# Patient Record
Sex: FEMALE | Race: WHITE | Employment: OTHER | URBAN - NONMETROPOLITAN AREA
[De-identification: names, ages, dates, MRNs, and addresses within clinical notes are randomized per-mention and may not be internally consistent; named-entity substitution may affect disease eponyms.]

---

## 2020-10-12 ENCOUNTER — PROCEDURE VISIT (OUTPATIENT)
Dept: OTOLARYNGOLOGY | Age: 35
End: 2020-10-12
Payer: MEDICARE

## 2020-10-12 ENCOUNTER — OFFICE VISIT (OUTPATIENT)
Dept: OTOLARYNGOLOGY | Age: 35
End: 2020-10-12
Payer: MEDICARE

## 2020-10-12 VITALS
HEIGHT: 67 IN | BODY MASS INDEX: 39.87 KG/M2 | DIASTOLIC BLOOD PRESSURE: 66 MMHG | SYSTOLIC BLOOD PRESSURE: 108 MMHG | WEIGHT: 254 LBS

## 2020-10-12 PROBLEM — H69.83 EUSTACHIAN TUBE DYSFUNCTION, BILATERAL: Status: ACTIVE | Noted: 2020-10-12

## 2020-10-12 PROCEDURE — G8427 DOCREV CUR MEDS BY ELIG CLIN: HCPCS | Performed by: PHYSICIAN ASSISTANT

## 2020-10-12 PROCEDURE — 99203 OFFICE O/P NEW LOW 30 MIN: CPT | Performed by: PHYSICIAN ASSISTANT

## 2020-10-12 PROCEDURE — 92557 COMPREHENSIVE HEARING TEST: CPT | Performed by: AUDIOLOGIST

## 2020-10-12 PROCEDURE — G8417 CALC BMI ABV UP PARAM F/U: HCPCS | Performed by: PHYSICIAN ASSISTANT

## 2020-10-12 PROCEDURE — G8484 FLU IMMUNIZE NO ADMIN: HCPCS | Performed by: PHYSICIAN ASSISTANT

## 2020-10-12 PROCEDURE — 92550 TYMPANOMETRY & REFLEX THRESH: CPT | Performed by: AUDIOLOGIST

## 2020-10-12 PROCEDURE — 1036F TOBACCO NON-USER: CPT | Performed by: PHYSICIAN ASSISTANT

## 2020-10-12 RX ORDER — PANTOPRAZOLE SODIUM 40 MG/1
TABLET, DELAYED RELEASE ORAL
COMMUNITY
Start: 2020-09-22

## 2020-10-12 RX ORDER — PSEUDOEPHEDRINE HYDROCHLORIDE 30 MG/1
30 TABLET ORAL 3 TIMES DAILY
Qty: 30 TABLET | Refills: 2 | Status: SHIPPED | OUTPATIENT
Start: 2020-10-12 | End: 2021-01-25 | Stop reason: SINTOL

## 2020-10-12 RX ORDER — IBUPROFEN 800 MG/1
TABLET ORAL
COMMUNITY
End: 2022-05-16

## 2020-10-12 RX ORDER — FLUTICASONE PROPIONATE 50 MCG
2 SPRAY, SUSPENSION (ML) NASAL 2 TIMES DAILY
Qty: 1 BOTTLE | Refills: 2 | Status: SHIPPED | OUTPATIENT
Start: 2020-10-12 | End: 2021-01-04

## 2020-10-12 RX ORDER — ACYCLOVIR 400 MG/1
800 TABLET ORAL
COMMUNITY

## 2020-10-12 RX ORDER — TRAZODONE HYDROCHLORIDE 150 MG/1
TABLET ORAL
COMMUNITY

## 2020-10-12 RX ORDER — ARMODAFINIL 250 MG/1
TABLET ORAL
COMMUNITY
Start: 2020-09-22

## 2020-10-12 RX ORDER — CEPHALEXIN 250 MG/1
CAPSULE ORAL
COMMUNITY
Start: 2020-09-14 | End: 2021-02-25 | Stop reason: ALTCHOICE

## 2020-10-12 RX ORDER — VENLAFAXINE HYDROCHLORIDE 75 MG/1
CAPSULE, EXTENDED RELEASE ORAL
COMMUNITY
End: 2021-01-04 | Stop reason: SDUPTHER

## 2020-10-12 RX ORDER — ECHINACEA PURPUREA EXTRACT 125 MG
2 TABLET ORAL NIGHTLY
Qty: 1 BOTTLE | Refills: 2 | Status: SHIPPED | OUTPATIENT
Start: 2020-10-12 | End: 2021-01-04

## 2020-10-12 RX ORDER — PROMETHAZINE HYDROCHLORIDE 25 MG/1
TABLET ORAL
COMMUNITY
Start: 2020-08-13 | End: 2022-05-16

## 2020-10-12 RX ORDER — OFLOXACIN 3 MG/ML
SOLUTION/ DROPS OPHTHALMIC
COMMUNITY
Start: 2020-09-12 | End: 2021-01-04

## 2020-10-12 RX ORDER — CEPHALEXIN 500 MG/1
CAPSULE ORAL
COMMUNITY
End: 2021-01-04

## 2020-10-12 RX ORDER — DICYCLOMINE HCL 20 MG
TABLET ORAL
COMMUNITY
Start: 2020-09-22 | End: 2021-01-04

## 2020-10-12 SDOH — HEALTH STABILITY: MENTAL HEALTH: HOW OFTEN DO YOU HAVE A DRINK CONTAINING ALCOHOL?: NEVER

## 2020-10-12 ASSESSMENT — ENCOUNTER SYMPTOMS
RHINORRHEA: 0
SINUS PRESSURE: 0
EYE DISCHARGE: 0
SINUS PAIN: 0
SORE THROAT: 0
TROUBLE SWALLOWING: 0
EYE PAIN: 0
FACIAL SWELLING: 0
VOICE CHANGE: 0

## 2020-10-12 NOTE — PROGRESS NOTES
[facility] OTOLARYNGOLOGY/ENT  Ms. Jefry White is a pleasant 41-year-old  female that was referred by Germain Hernandez to problems with recurrent ear infections. She reports that she has been treated about 3 times with antibiotics over the last 6 months. She reports that the right ear has been worse than the left with recurring pain and a pressure sensation. She denies any obvious hearing loss or any drainage to the external ear canal.  She also denies any fever, chills, or any additional problems. She has a negative history of sinusitis in the past.  She admits to having issues with sleep apnea and had recent testing that did not recommend BiPAP or CPAP. Allergies: Lexapro  [escitalopram oxalate]; Penicillins; and Tamiflu  [oseltamivir phosphate]      Current Outpatient Medications   Medication Sig Dispense Refill    ibuprofen (ADVIL;MOTRIN) 800 MG tablet ibuprofen 800 mg tablet   1 tab tid prn pain      acyclovir (ZOVIRAX) 400 MG tablet acyclovir 400 mg tablet   Take 1 tablet 3 times a day by oral route as needed for 30 days.  cephALEXin (KEFLEX) 250 MG capsule       cephALEXin (KEFLEX) 500 MG capsule Keflex 500 mg capsule   Take 1 capsule twice a day by oral route with meals.       Armodafinil 250 MG TABS TAKE ONE TABLET BY MOUTH DAILY      sertraline (ZOLOFT) 50 MG tablet sertraline 50 mg tablet   TAKE ONE TABLET BY MOUTH DAILY      traZODone (DESYREL) 150 MG tablet trazodone 150 mg tablet   1 tab QHS      venlafaxine (EFFEXOR XR) 75 MG extended release capsule venlafaxine ER 75 mg capsule,extended release 24 hr   TAKE ONE CAPSULE BY MOUTH AT BEDTIME      pantoprazole (PROTONIX) 40 MG tablet TAKE ONE TABLET BY MOUTH DAILY      dicyclomine (BENTYL) 20 MG tablet TAKE ONE TABLET BY MOUTH FOUR TIMES DAILY      promethazine (PHENERGAN) 25 MG tablet       ofloxacin (OCUFLOX) 0.3 % solution INSTILL 5 DROPS IN RIGHT EAR TWICE A DAY      pseudoephedrine (DECONGESTANT) 30 MG tablet Take 1 tablet by mouth 3 times daily 30 tablet 2    fluticasone (FLONASE) 50 MCG/ACT nasal spray 2 sprays by Each Nostril route 2 times daily 1 Bottle 2    sodium chloride (ALTAMIST SPRAY) 0.65 % nasal spray 2 sprays by Nasal route nightly 1 Bottle 2     No current facility-administered medications for this visit. Past Surgical History:   Procedure Laterality Date    APPENDECTOMY       SECTION      CHOLECYSTECTOMY      TONSILLECTOMY      TUBAL LIGATION         Past Medical History:   Diagnosis Date    Allergic rhinitis     Asthma     Tinnitus        History reviewed. No pertinent family history. Social History     Tobacco Use    Smoking status: Former Smoker     Packs/day: 1.00     Start date: 10/8/2020    Smokeless tobacco: Never Used   Substance Use Topics    Alcohol use: Never     Frequency: Never           REVIEW OF SYSTEMS:  all other systems reviewed and are negative  Review of Systems   Constitutional: Negative for chills and fever. HENT: Positive for ear pain. Negative for congestion, dental problem, ear discharge, facial swelling, hearing loss, nosebleeds, postnasal drip, rhinorrhea, sinus pressure, sinus pain, sneezing, sore throat, tinnitus, trouble swallowing and voice change. Eyes: Negative for pain and discharge. Comments:     PHYSICAL EXAM:    /66   Ht 5' 7\" (1.702 m)   Wt 254 lb (115.2 kg)   BMI 39.78 kg/m²   Body mass index is 39.78 kg/m².     General Appearance: well developed  and well nourished  Head/ Face: normocephalic and atraumatic  Vocal Quality: good/ normal  Ears: Right Ear: External: external ears normal Otoscopy Ear Canal: canal clear Otoscopy TM: TM's dull and air/fluid level Left Ear: External: external ears normal Otoscopy Ear Canal: canal clear Otoscopy TM: TM's dull and air/fluid level  Hearing: Rinne A>B: Left, Rinne A>B: Right and Chaudhary M  Nose: nares normal and septum midline  Neck: supple and mass No  Thyroid: nodules No    Assessment & Plan:    Problem List Items Addressed This Visit     Eustachian tube dysfunction, bilateral - Primary     Eustachian tube dysfunction-bilateral  Plan: I will place the patient on Sudafed 30 mg 3 times daily as well as Flonase nasal spray 2 squirts twice daily and Ocean nasal spray at night. She is to follow-up in the clinic in 2 weeks for reevaluation. She is reminded to call she has any further questions or problems. No orders of the defined types were placed in this encounter. Orders Placed This Encounter   Medications    pseudoephedrine (DECONGESTANT) 30 MG tablet     Sig: Take 1 tablet by mouth 3 times daily     Dispense:  30 tablet     Refill:  2    fluticasone (FLONASE) 50 MCG/ACT nasal spray     Si sprays by Each Nostril route 2 times daily     Dispense:  1 Bottle     Refill:  2    sodium chloride (ALTAMIST SPRAY) 0.65 % nasal spray     Si sprays by Nasal route nightly     Dispense:  1 Bottle     Refill:  2       Electronically signed by John Eisenberg PA-C on 10/12/20 at 11:18 AM CDT          Please note that this chart was generated using dragon dictation software. Although every effort was made to ensure the accuracy of this automated transcription, some errors in transcription may have occurred.

## 2020-10-12 NOTE — PROGRESS NOTES
History   Summer Ley is a 28 y.o. female who presented to the clinic this date with complaints of bilateral ear pain and history of recurrent ear infection. She reported long standing problems. She also noted decreased hearing and occasional tinnitus. She was seen at a walk in clinic on two days ago and was told she had bilateral TM perforations, fluid in her right ear, and tissue damage in her left ear. She is currently being treated with abx and ear drops. Summary   Immitance measures consistent with normal TM mobility and possible ossicular fixation bilaterally. Pure tone testing indicates hearing WNL right and mild SNHL left. Word recognition scores were excellent bilaterally. Results   Otoscopy:    Right: Tympanosclerosis noted on TM   Left: Tympanosclerosis noted on TM    Audiometry:    Right: Hearing WNL     Left: Mild sloping SNHL         Tympanometry:     Right: Type A   Left: Type A    Acoustic Reflex Thresholds:     0.5 kHz 1 kHz 2 kHz  0.5 kHz 1 kHz 2 kHz   Right Ipsilateral  Probe Right, Stim Right Present Present Absent  Left Ipsilateral  Probe Left, Stim Left  Absent   Elevated  Elevated   Left Contralateral  Probe Right, Stim Left Absent  Absent  Absent  Right Contralateral  Probe Left, Stim Right  Absent   Absent   Absent        Plan   Results of today's testing were discussed with Ms. Tj Sandoval and the following recommendations were made:    1. Follow up with ENT as scheduled. 2. Recheck hearing following medical management.         Audiogram and Acoustic Immittance

## 2020-11-02 ENCOUNTER — OFFICE VISIT (OUTPATIENT)
Dept: OTOLARYNGOLOGY | Age: 35
End: 2020-11-02
Payer: MEDICARE

## 2020-11-02 VITALS
BODY MASS INDEX: 38.61 KG/M2 | WEIGHT: 246 LBS | SYSTOLIC BLOOD PRESSURE: 130 MMHG | HEIGHT: 67 IN | DIASTOLIC BLOOD PRESSURE: 82 MMHG

## 2020-11-02 PROBLEM — H69.83 EUSTACHIAN TUBE DYSFUNCTION, BILATERAL: Status: RESOLVED | Noted: 2020-10-12 | Resolved: 2020-11-02

## 2020-11-02 PROCEDURE — 99213 OFFICE O/P EST LOW 20 MIN: CPT | Performed by: PHYSICIAN ASSISTANT

## 2020-11-02 PROCEDURE — G8427 DOCREV CUR MEDS BY ELIG CLIN: HCPCS | Performed by: PHYSICIAN ASSISTANT

## 2020-11-02 PROCEDURE — G8417 CALC BMI ABV UP PARAM F/U: HCPCS | Performed by: PHYSICIAN ASSISTANT

## 2020-11-02 PROCEDURE — G8484 FLU IMMUNIZE NO ADMIN: HCPCS | Performed by: PHYSICIAN ASSISTANT

## 2020-11-02 PROCEDURE — 1036F TOBACCO NON-USER: CPT | Performed by: PHYSICIAN ASSISTANT

## 2020-11-02 NOTE — PROGRESS NOTES
Chichi Sevilla is a pleasant 77-year-old  female that presents for a 2-week follow-up due to eustachian tube dysfunction of the ears bilaterally. Currently she reports that the right ear still feels full and is not back to normal at this point. She admits to some muffled hearing that is most notable on the right side compared to left. Physical examination with the microscope revealed the patient to have a normal-appearing TM to the left side with chronic scarring. There is normal mobility to the left side. The right side was examined and demonstrated no evidence of a middle ear effusion. The right TM was noted with normal mobility. She was also noted with chronic scarring of the right TM particular to the central portion from prior infections and myringotomy tubes. Neck exam and thyroid exam was reperformed demonstrated no changes. Oral exam also demonstrated no changes. Impression: Resolved eustachian tube dysfunction bilaterally, bilateral otosclerosis-likely source of hearing loss-please refer to recent audiology studies      Plan: I advised the patient to continue the Flonase and Sudafed as needed for recurrence of the middle ear effusion. Overall I believe her hearing loss is related to the otosclerosis and will be a chronic issue for her. She is to follow-up in 1 year with [facility] for audiology studies. She is to follow-up with me on a as needed basis.       Electronically signed by Harrison Barnett PA-C on 11/2/20 at 9:39 AM CST

## 2020-11-12 ENCOUNTER — OFFICE VISIT (OUTPATIENT)
Dept: OTOLARYNGOLOGY | Age: 35
End: 2020-11-12
Payer: MEDICARE

## 2020-11-12 VITALS
SYSTOLIC BLOOD PRESSURE: 128 MMHG | BODY MASS INDEX: 38.3 KG/M2 | HEIGHT: 67 IN | WEIGHT: 244 LBS | DIASTOLIC BLOOD PRESSURE: 78 MMHG

## 2020-11-12 PROCEDURE — G8417 CALC BMI ABV UP PARAM F/U: HCPCS | Performed by: PHYSICIAN ASSISTANT

## 2020-11-12 PROCEDURE — G8484 FLU IMMUNIZE NO ADMIN: HCPCS | Performed by: PHYSICIAN ASSISTANT

## 2020-11-12 PROCEDURE — G8427 DOCREV CUR MEDS BY ELIG CLIN: HCPCS | Performed by: PHYSICIAN ASSISTANT

## 2020-11-12 PROCEDURE — 4130F TOPICAL PREP RX AOE: CPT | Performed by: PHYSICIAN ASSISTANT

## 2020-11-12 PROCEDURE — 1036F TOBACCO NON-USER: CPT | Performed by: PHYSICIAN ASSISTANT

## 2020-11-12 PROCEDURE — 99213 OFFICE O/P EST LOW 20 MIN: CPT | Performed by: PHYSICIAN ASSISTANT

## 2020-11-12 RX ORDER — CLOTRIMAZOLE 1 G/ML
SOLUTION TOPICAL
Qty: 1 BOTTLE | Refills: 1 | Status: SHIPPED | OUTPATIENT
Start: 2020-11-12 | End: 2020-12-15

## 2020-11-12 RX ORDER — CIPROFLOXACIN AND DEXAMETHASONE 3; 1 MG/ML; MG/ML
4 SUSPENSION/ DROPS AURICULAR (OTIC) 2 TIMES DAILY
Qty: 1 BOTTLE | Refills: 0 | Status: SHIPPED | OUTPATIENT
Start: 2020-11-12 | End: 2020-11-22

## 2020-11-12 NOTE — PROGRESS NOTES
Christoph is a pleasant 63-year-old  female that had seen back in November 2 due to ear pain. She was initially treated for eustachian tube dysfunction but continued with pain after the middle ear effusion had resolved. She reports over the last 3 or 4 days she has had drainage from the right ear and worsening pain. Due to this change she was advised to come in today for evaluation. She denies any fever, chills, or any additional problems    Physical examination revealed the patient had a right-sided otitis externa. This is questionable for questionable fungal etiology versus bacterial.  The TM appeared to be normal with no evidence of otitis media. The left side was noted be negative with no evidence of infection. She was noted to have pretty substantial scarring consistent with otosclerosis    Impression: Otitis externa of the right ear    Plan: I will place Christoph on Ciprodex as well as Lotrimin eardrops. The patient is to follow-up in 2 weeks for reevaluation. She is also reminded to call if she has any questions problems.       Electronically signed by Karlene Espinosa PA-C on 11/12/20 at 6:18 PM CST

## 2020-12-15 ENCOUNTER — OFFICE VISIT (OUTPATIENT)
Dept: OTOLARYNGOLOGY | Age: 35
End: 2020-12-15
Payer: MEDICARE

## 2020-12-15 VITALS
SYSTOLIC BLOOD PRESSURE: 138 MMHG | DIASTOLIC BLOOD PRESSURE: 78 MMHG | HEIGHT: 67 IN | WEIGHT: 242 LBS | BODY MASS INDEX: 37.98 KG/M2

## 2020-12-15 PROCEDURE — G8427 DOCREV CUR MEDS BY ELIG CLIN: HCPCS | Performed by: PHYSICIAN ASSISTANT

## 2020-12-15 PROCEDURE — 4130F TOPICAL PREP RX AOE: CPT | Performed by: PHYSICIAN ASSISTANT

## 2020-12-15 PROCEDURE — 1036F TOBACCO NON-USER: CPT | Performed by: PHYSICIAN ASSISTANT

## 2020-12-15 PROCEDURE — G8417 CALC BMI ABV UP PARAM F/U: HCPCS | Performed by: PHYSICIAN ASSISTANT

## 2020-12-15 PROCEDURE — 99213 OFFICE O/P EST LOW 20 MIN: CPT | Performed by: PHYSICIAN ASSISTANT

## 2020-12-15 PROCEDURE — G8484 FLU IMMUNIZE NO ADMIN: HCPCS | Performed by: PHYSICIAN ASSISTANT

## 2020-12-15 RX ORDER — OFLOXACIN 3 MG/ML
4 SOLUTION/ DROPS OPHTHALMIC 3 TIMES DAILY
Qty: 1 BOTTLE | Refills: 0 | Status: SHIPPED | OUTPATIENT
Start: 2020-12-15 | End: 2020-12-25

## 2020-12-15 RX ORDER — CLOTRIMAZOLE 1 G/ML
SOLUTION TOPICAL
Qty: 1 BOTTLE | Refills: 1 | Status: SHIPPED | OUTPATIENT
Start: 2020-12-15 | End: 2021-01-25 | Stop reason: ALTCHOICE

## 2020-12-15 NOTE — PROGRESS NOTES
Christoph is a pleasant 60-year-old  female that presents for a 2-week follow-up due to otitis externa to the right ear. She initially had issues with eustachian tube dysfunction that was treated with our routine regiment. After completion of the medication, she began to have worsening pain to the right ear and was noted to have a questionable fungal otitis externa. Currently she reports that her ear is feeling worse and is not improving. She reports the drainage is about the same. She still experiences muffled hearing and popping to the right ear which has not improved with medications. Physical examination with the microscope revealed the patient to have evidence of otitis externa with a very tender ear canal.  The TM was able to be visualized with scar tissue noted on the TM with a small amount of residual fluid still present. The TM was nonmobile to pneumonic exam.  Examination of the left side demonstrated a small amount of fluid to be present also with air-fluid level noted. Impression: Persistent otitis externa of the right ear secondary to microdrainage from the middle ear effusion, eustachian tube dysfunction of the right ear with failed medical management      Plan: The risks, benefits, and options of treatment were discussed with the patient. She is desirous to talk to Dr. Trev Sosa about placement of a myringotomy tube to the right ear. I will place her on ofloxacin eardrops as well as Lotrimin eardrops until she sees Dr. Trev Sosa. She also has an appointment to see Henry Black prior to her evaluation. She is reminded to call she has any questions or problems.       Electronically signed by Arley Houser PA-C on 12/15/20 at 11:07 AM CST

## 2021-01-04 ENCOUNTER — OFFICE VISIT (OUTPATIENT)
Dept: PRIMARY CARE CLINIC | Age: 36
End: 2021-01-04
Payer: MEDICARE

## 2021-01-04 VITALS
OXYGEN SATURATION: 98 % | SYSTOLIC BLOOD PRESSURE: 144 MMHG | DIASTOLIC BLOOD PRESSURE: 86 MMHG | HEIGHT: 67 IN | TEMPERATURE: 97 F | BODY MASS INDEX: 37.79 KG/M2 | HEART RATE: 80 BPM | WEIGHT: 240.8 LBS

## 2021-01-04 DIAGNOSIS — F41.1 GAD (GENERALIZED ANXIETY DISORDER): ICD-10-CM

## 2021-01-04 DIAGNOSIS — G47.429 NARCOLEPSY DUE TO UNDERLYING CONDITION WITHOUT CATAPLEXY: ICD-10-CM

## 2021-01-04 DIAGNOSIS — R03.0 ELEVATED BP WITHOUT DIAGNOSIS OF HYPERTENSION: ICD-10-CM

## 2021-01-04 DIAGNOSIS — F33.1 MODERATE EPISODE OF RECURRENT MAJOR DEPRESSIVE DISORDER (HCC): Primary | ICD-10-CM

## 2021-01-04 PROCEDURE — 99204 OFFICE O/P NEW MOD 45 MIN: CPT | Performed by: NURSE PRACTITIONER

## 2021-01-04 RX ORDER — VENLAFAXINE HYDROCHLORIDE 150 MG/1
150 CAPSULE, EXTENDED RELEASE ORAL DAILY
Qty: 30 CAPSULE | Refills: 5 | Status: SHIPPED | OUTPATIENT
Start: 2021-01-04 | End: 2021-06-28

## 2021-01-04 ASSESSMENT — ENCOUNTER SYMPTOMS
EYE REDNESS: 0
TROUBLE SWALLOWING: 0
COUGH: 0
ABDOMINAL PAIN: 0
WHEEZING: 0
RHINORRHEA: 0
DIARRHEA: 0
BLOOD IN STOOL: 0
SORE THROAT: 0
EYE DISCHARGE: 0
CONSTIPATION: 0

## 2021-01-04 ASSESSMENT — PATIENT HEALTH QUESTIONNAIRE - PHQ9
SUM OF ALL RESPONSES TO PHQ QUESTIONS 1-9: 2
2. FEELING DOWN, DEPRESSED OR HOPELESS: 1
SUM OF ALL RESPONSES TO PHQ QUESTIONS 1-9: 2
SUM OF ALL RESPONSES TO PHQ9 QUESTIONS 1 & 2: 2

## 2021-01-04 NOTE — PROGRESS NOTES
[address], [address]  Phone [phone]   Fax [phone]      OFFICE VISIT: 2021    Humberto Perez is a 28 y.o. female who presents today for her medical conditions/complaints as noted below. July Darell Perez isc/o of Establish Care (pt having bouts of depression and anxiety, has been to [facility] for counseling but hasn't been there since she thinks 2020. She wakes up crying and doesn't know what she is so upset about. She thinks possibly her medications are making her do this) and Anxiety        :     HPI  Establish Care (pt having bouts of depression and anxiety, has been to [facility] for counseling but hasn't been there since she thinks 2020. She wakes up crying and doesn't know what she is so upset about. She thinks possibly her medications are making her do this) and Anxiety  States she has woke up crying for the last 3 days. Currently she is taking effexor xr 75 mg  zoloft 50 mg   trazadone 150 mg daily    Sees dennise ma. She has narcolepsy and is on nuvigil and this is doing better with this. Ear infection. States she has a chronic ear infection and is getting tubes tomorrow.      Past Medical History:   Diagnosis Date    Allergic rhinitis     Asthma     Narcolepsy due to underlying condition without cataplexy     Tinnitus       Past Surgical History:   Procedure Laterality Date    APPENDECTOMY       SECTION      CHOLECYSTECTOMY      TONSILLECTOMY      TUBAL LIGATION         Family History   Problem Relation Age of Onset    Hypothyroidism Mother     Liver Cancer Maternal Grandmother        Social History     Tobacco Use    Smoking status: Former Smoker     Packs/day: 1.00     Start date: 10/8/2020    Smokeless tobacco: Never Used   Substance Use Topics    Alcohol use: Never     Frequency: Never      Current Outpatient Medications   Medication Sig Dispense Refill    venlafaxine (EFFEXOR XR) 150 MG extended release capsule Take 1 capsule by mouth daily 30 capsule 5    ibuprofen (ADVIL;MOTRIN) 800 MG tablet ibuprofen 800 mg tablet   1 tab tid prn pain      acyclovir (ZOVIRAX) 400 MG tablet 800 mg Pt states she is taking 800mg bid      cephALEXin (KEFLEX) 250 MG capsule       Armodafinil 250 MG TABS TAKE ONE TABLET BY MOUTH DAILY      traZODone (DESYREL) 150 MG tablet trazodone 150 mg tablet   1 tab QHS      pantoprazole (PROTONIX) 40 MG tablet TAKE ONE TABLET BY MOUTH DAILY      promethazine (PHENERGAN) 25 MG tablet       pseudoephedrine (DECONGESTANT) 30 MG tablet Take 1 tablet by mouth 3 times daily 30 tablet 2    clotrimazole (LOTRIMIN) 1 % external solution Apply topically 2 times daily to the right ear (Patient not taking: Reported on 1/4/2021) 1 Bottle 1     No current facility-administered medications for this visit. Allergies   Allergen Reactions    Lexapro  [Escitalopram Oxalate] Hives    Penicillins Rash    Tamiflu  [Oseltamivir Phosphate] Rash       Health Maintenance   Topic Date Due    Hepatitis C screen  1985    Varicella vaccine (1 of 2 - 2-dose childhood series) 06/21/1986    HIV screen  06/21/2000    Cervical cancer screen  06/21/2006    Flu vaccine (1) 09/01/2020    Annual Wellness Visit (AWV)  10/11/2020    DTaP/Tdap/Td vaccine (2 - Td) 07/15/2029    Hepatitis A vaccine  Aged Out    Hepatitis B vaccine  Aged Out    Hib vaccine  Aged Out    Meningococcal (ACWY) vaccine  Aged Out    Pneumococcal 0-64 years Vaccine  Aged Out        :     Review of Systems   Constitutional: Negative for appetite change and unexpected weight change. HENT: Positive for ear pain. Negative for congestion, rhinorrhea, sore throat and trouble swallowing. Eyes: Negative for discharge and redness. Respiratory: Negative for cough and wheezing. Cardiovascular: Negative for chest pain. Gastrointestinal: Negative for abdominal pain, blood in stool, constipation and diarrhea.    Genitourinary: Medications    venlafaxine (EFFEXOR XR) 150 MG extended release capsule     Sig: Take 1 capsule by mouth daily     Dispense:  30 capsule     Refill:  5         Discussed use, benefit, and side effectsof prescribed medications. All patient questions answered. Pt voiced understanding. Reviewed health maintenance. .  Patient agreed with treatment plan. Follow up asdirected. There are no Patient Instructions on file for this visit.       Electronically signed by EBONI Shoemaker on1/4/2021 at 12:29 PM

## 2021-01-06 DIAGNOSIS — Z00.00 ENCOUNTER FOR WELL ADULT EXAM WITHOUT ABNORMAL FINDINGS: Primary | ICD-10-CM

## 2021-01-06 LAB
ALBUMIN SERPL-MCNC: 4.4 G/DL (ref 3.5–5.2)
ALP BLD-CCNC: 93 U/L (ref 35–104)
ALT SERPL-CCNC: 17 U/L (ref 5–33)
ANION GAP SERPL CALCULATED.3IONS-SCNC: 8 MMOL/L (ref 7–19)
AST SERPL-CCNC: 15 U/L (ref 5–32)
BASOPHILS ABSOLUTE: 0 K/UL (ref 0–0.2)
BASOPHILS RELATIVE PERCENT: 0.5 % (ref 0–1)
BILIRUB SERPL-MCNC: 0.3 MG/DL (ref 0.2–1.2)
BUN BLDV-MCNC: 7 MG/DL (ref 6–20)
CALCIUM SERPL-MCNC: 10 MG/DL (ref 8.6–10)
CHLORIDE BLD-SCNC: 104 MMOL/L (ref 98–111)
CHOLESTEROL, TOTAL: 189 MG/DL (ref 160–199)
CO2: 28 MMOL/L (ref 22–29)
CREAT SERPL-MCNC: 0.8 MG/DL (ref 0.5–0.9)
EOSINOPHILS ABSOLUTE: 0.1 K/UL (ref 0–0.6)
EOSINOPHILS RELATIVE PERCENT: 1.7 % (ref 0–5)
GFR AFRICAN AMERICAN: >59
GFR NON-AFRICAN AMERICAN: >60
GLUCOSE BLD-MCNC: 86 MG/DL (ref 74–109)
HBA1C MFR BLD: 5.2 % (ref 4–6)
HCT VFR BLD CALC: 42 % (ref 37–47)
HDLC SERPL-MCNC: 55 MG/DL (ref 65–121)
HEMOGLOBIN: 14 G/DL (ref 12–16)
IMMATURE GRANULOCYTES #: 0 K/UL
LDL CHOLESTEROL CALCULATED: 113 MG/DL
LYMPHOCYTES ABSOLUTE: 2 K/UL (ref 1.1–4.5)
LYMPHOCYTES RELATIVE PERCENT: 26.9 % (ref 20–40)
MCH RBC QN AUTO: 31 PG (ref 27–31)
MCHC RBC AUTO-ENTMCNC: 33.3 G/DL (ref 33–37)
MCV RBC AUTO: 92.9 FL (ref 81–99)
MONOCYTES ABSOLUTE: 0.5 K/UL (ref 0–0.9)
MONOCYTES RELATIVE PERCENT: 6.1 % (ref 0–10)
NEUTROPHILS ABSOLUTE: 4.8 K/UL (ref 1.5–7.5)
NEUTROPHILS RELATIVE PERCENT: 64.5 % (ref 50–65)
PDW BLD-RTO: 12 % (ref 11.5–14.5)
PLATELET # BLD: 309 K/UL (ref 130–400)
PMV BLD AUTO: 10.8 FL (ref 9.4–12.3)
POTASSIUM SERPL-SCNC: 4 MMOL/L (ref 3.5–5)
RBC # BLD: 4.52 M/UL (ref 4.2–5.4)
SODIUM BLD-SCNC: 140 MMOL/L (ref 136–145)
T4 FREE: 1.12 NG/DL (ref 0.93–1.7)
TOTAL PROTEIN: 7.6 G/DL (ref 6.6–8.7)
TRIGL SERPL-MCNC: 105 MG/DL (ref 0–149)
TSH SERPL DL<=0.05 MIU/L-ACNC: 0.56 UIU/ML (ref 0.27–4.2)
WBC # BLD: 7.4 K/UL (ref 4.8–10.8)

## 2021-01-25 ENCOUNTER — PROCEDURE VISIT (OUTPATIENT)
Dept: ENT CLINIC | Age: 36
End: 2021-01-25
Payer: MEDICARE

## 2021-01-25 ENCOUNTER — PROCEDURE VISIT (OUTPATIENT)
Dept: ENT CLINIC | Age: 36
End: 2021-01-25

## 2021-01-25 VITALS — HEIGHT: 67 IN | WEIGHT: 234 LBS | BODY MASS INDEX: 36.73 KG/M2

## 2021-01-25 DIAGNOSIS — H92.01 EAR PAIN, RIGHT: ICD-10-CM

## 2021-01-25 DIAGNOSIS — J03.90 LINGUAL TONSILLITIS: ICD-10-CM

## 2021-01-25 PROCEDURE — 99999 PR OFFICE/OUTPT VISIT,PROCEDURE ONLY: CPT | Performed by: AUDIOLOGIST

## 2021-01-25 PROCEDURE — 99213 OFFICE O/P EST LOW 20 MIN: CPT | Performed by: OTOLARYNGOLOGY

## 2021-01-25 PROCEDURE — 31575 DIAGNOSTIC LARYNGOSCOPY: CPT | Performed by: OTOLARYNGOLOGY

## 2021-01-25 RX ORDER — METHYLPREDNISOLONE 4 MG/1
TABLET ORAL
Qty: 26 TABLET | Refills: 0 | Status: SHIPPED | OUTPATIENT
Start: 2021-01-25 | End: 2021-01-25 | Stop reason: CLARIF

## 2021-01-25 RX ORDER — CLINDAMYCIN HYDROCHLORIDE 300 MG/1
300 CAPSULE ORAL 3 TIMES DAILY
Qty: 30 CAPSULE | Refills: 0 | Status: SHIPPED | OUTPATIENT
Start: 2021-01-25 | End: 2021-02-15

## 2021-01-25 RX ORDER — METHYLPREDNISOLONE 4 MG/1
TABLET ORAL
Qty: 26 TABLET | Refills: 0 | Status: SHIPPED | OUTPATIENT
Start: 2021-01-25 | End: 2021-02-25 | Stop reason: ALTCHOICE

## 2021-01-25 NOTE — ASSESSMENT & PLAN NOTE
Audiogram from 10/12/2020 shows asymmetric sensorineural loss in left ear  Interestingly with a 512 Hz tuning fork she felt her left ear was the better hearing ear.

## 2021-01-25 NOTE — ASSESSMENT & PLAN NOTE
Complains of chronic right ear discomfort. Unremarkable examination of TM and no evidence of middle ear fluid. Ear canal clear. Has unusual scar formation at tonsillar fossa. She states that her ear discomfort dates back to her prior tonsillectomy and is possible this is some type of neuroma formation. Does have slight tenderness at right TMJ.   Patient has no maxillary teeth whatsoever and no mandibular molars so this is nothing that can be treated with a bite guard etc.

## 2021-01-25 NOTE — PROGRESS NOTES
28 y.o.  female presents today with long history of right ear discomfort. She states this dates back to childhood. She reports right ear discomfort that she has been aware of since her tonsils were removed.     Family History   Problem Relation Age of Onset    Hypothyroidism Mother     Liver Cancer Maternal Grandmother      Social History     Socioeconomic History    Marital status:      Spouse name: None    Number of children: None    Years of education: None    Highest education level: None   Occupational History    None   Social Needs    Financial resource strain: None    Food insecurity     Worry: None     Inability: None    Transportation needs     Medical: None     Non-medical: None   Tobacco Use    Smoking status: Former Smoker     Packs/day: 1.00     Start date: 10/8/2020    Smokeless tobacco: Never Used   Substance and Sexual Activity    Alcohol use: Never     Frequency: Never    Drug use: Never    Sexual activity: Yes   Lifestyle    Physical activity     Days per week: None     Minutes per session: None    Stress: None   Relationships    Social connections     Talks on phone: None     Gets together: None     Attends [employer] service: None     Active member of club or organization: None     Attends meetings of clubs or organizations: None     Relationship status: None    Intimate partner violence     Fear of current or ex partner: None     Emotionally abused: None     Physically abused: None     Forced sexual activity: None   Other Topics Concern    None   Social History Narrative    None     Past Medical History:   Diagnosis Date    Allergic rhinitis     Asthma     Narcolepsy due to underlying condition without cataplexy     Tinnitus      Past Surgical History:   Procedure Laterality Date    APPENDECTOMY       SECTION      CHOLECYSTECTOMY      TONSILLECTOMY      TUBAL LIGATION           REVIEW OF SYSTEMS:  all other systems reviewed and are negative  General Health: see HPI / problem list  Ears: ear pain Yes Bilateral recent infection No  Bilateral recent drainage No  Bilateral  Hearing: hearing loss: Yes  Throat: pain: No, vocal difficulties: No, difficulty swallowing: No and painful swallowing: No       Comments:     PHYSICAL EXAM:    Ht 5' 7\" (1.702 m)   Wt 234 lb (106.1 kg)   BMI 36.65 kg/m²   Body mass index is 36.65 kg/m². General Appearance: well developed , well nourished and no distress  Head/ Face: normocephalic and atraumatic  Vocal Quality: good/ normal  Ears: Right Ear: External: external ears normal Otoscopy Ear Canal: canal clear Otoscopy TM: TM's mobile and TM's intact Left Ear: External: external ears normal Otoscopy Ear Canal: canal clear Otoscopy TM: TM's mobile and TM's intact  Hearing: Rinne A>B: Left and Rinne A>B: Right  Dentition: edentulous maxilla   Tonsils: s/p tonsillectomy and Diffuse scarring at tonsillectomy site  Neck: negative, supple and adenopathy none palpable  Larynx: normal and see endoscopy report  Hypopharynx: see endoscopy report  Neuro: alert and oriented x3 and cranial nerves II- XII grossly intact  Psych/ Mood: cooperative and no depression, anxiety or agitation    Assessment & Plan:    Problem List Items Addressed This Visit        ENT Problems    Asymmetrical hearing loss, left     Audiogram from 10/12/2020 shows asymmetric sensorineural loss in left ear  Interestingly with a 512 Hz tuning fork she felt her left ear was the better hearing ear. Lingual tonsillitis     Prominent lingual tonsils. They are fairly symmetric in appearance. They were cryptic and slightly inflamed. Certainly this could be a source of her complaints of chronic ear discomfort. Admittedly she does not complain of throat pain but they are prominent on exam and likely hypertrophic.   There fairly symmetric however and with no adenopathy there is no suspicion malignancy  We will treat with clindamycin and Medrol            Other    Ear pain, right     Complains of chronic right ear discomfort. Unremarkable examination of TM and no evidence of middle ear fluid. Ear canal clear. Has unusual scar formation at tonsillar fossa. She states that her ear discomfort dates back to her prior tonsillectomy and is possible this is some type of neuroma formation. Does have slight tenderness at right TMJ. Patient has no maxillary teeth whatsoever and no mandibular molars so this is nothing that can be treated with a bite guard etc.               No orders of the defined types were placed in this encounter. Orders Placed This Encounter   Medications    clindamycin (CLEOCIN) 300 MG capsule     Sig: Take 1 capsule by mouth 3 times daily for 21 days     Dispense:  30 capsule     Refill:  0    methylPREDNISolone (MEDROL, RONNIE,) 4 MG tablet     Sig: 3 tablets twice daily for 1st 2 days, then 2 tablets twice daily for next 2 days, then 2 tablets once daily for next 2 days, then 1 tablet once daily for final 2 days. Dispense:  26 tablet     Refill:  0             Please note that this chart was generated using dragon dictation software. Although every effort was made to ensure the accuracy of this automated transcription, some errors in transcription may have occurred.

## 2021-01-25 NOTE — ASSESSMENT & PLAN NOTE
Prominent lingual tonsils. They are fairly symmetric in appearance. They were cryptic and slightly inflamed. Certainly this could be a source of her complaints of chronic ear discomfort. Admittedly she does not complain of throat pain but they are prominent on exam and likely hypertrophic.   There fairly symmetric however and with no adenopathy there is no suspicion malignancy  We will treat with clindamycin and Medrol

## 2021-02-25 ENCOUNTER — PROCEDURE VISIT (OUTPATIENT)
Dept: ENT CLINIC | Age: 36
End: 2021-02-25
Payer: MEDICARE

## 2021-02-25 ENCOUNTER — OFFICE VISIT (OUTPATIENT)
Dept: ENT CLINIC | Age: 36
End: 2021-02-25
Payer: MEDICARE

## 2021-02-25 VITALS — BODY MASS INDEX: 36.88 KG/M2 | WEIGHT: 235 LBS | HEIGHT: 67 IN

## 2021-02-25 DIAGNOSIS — H92.01 EAR PAIN, RIGHT: ICD-10-CM

## 2021-02-25 DIAGNOSIS — J03.90 LINGUAL TONSILLITIS: ICD-10-CM

## 2021-02-25 PROCEDURE — 99213 OFFICE O/P EST LOW 20 MIN: CPT | Performed by: OTOLARYNGOLOGY

## 2021-02-25 PROCEDURE — 92567 TYMPANOMETRY: CPT | Performed by: AUDIOLOGIST

## 2021-02-25 PROCEDURE — 92553 AUDIOMETRY AIR & BONE: CPT | Performed by: AUDIOLOGIST

## 2021-02-25 RX ORDER — TOPIRAMATE 25 MG/1
25 TABLET ORAL DAILY
COMMUNITY
End: 2022-05-16

## 2021-02-25 NOTE — PROGRESS NOTES
History   Gila Perez is a 28 y.o. female who presented to the clinic this date for a recheck of hearing due to history of left asymmetric hearing loss and enlarged tonsils. She denied problems or concerns since last visit. Summary   Tympanometry consistent with abnormally stiff TM right and normal TM mobility left. Pure tone testing indicates hearing WNL right and mild high frequency SNHL left. When compared to audiogram obtained in October, thresholds remained stable in the right ear and showed improvement in the left ear. Results   Otoscopy:    Right: Tympanosclerosis noted on TM   Left: Tympanosclerosis noted on TM    Audiometry:    Right: Hearing WNL     Left: Mild high frequency SNHL         Tympanometry:     Right: Type A   Left: Type A    Plan   Results of today's testing were discussed with Ms. Moises Diaz and the following recommendations were made:    1. Follow up with ENT as scheduled.         Audiogram and Acoustic Immittance

## 2021-02-25 NOTE — PROGRESS NOTES
28 y.o.  female presents today for follow-up. She is taken the medications as recommended. She is aware of improved hearing levels in her left ear.   She no longer complains of throat or ear pain and overall is feeling much better compared to her initial presentation    Family History   Problem Relation Age of Onset    Hypothyroidism Mother     Liver Cancer Maternal Grandmother      Social History     Socioeconomic History    Marital status:      Spouse name: None    Number of children: None    Years of education: None    Highest education level: None   Occupational History    None   Social Needs    Financial resource strain: None    Food insecurity     Worry: None     Inability: None    Transportation needs     Medical: None     Non-medical: None   Tobacco Use    Smoking status: Former Smoker     Packs/day: 1.00     Start date: 10/8/2020    Smokeless tobacco: Never Used   Substance and Sexual Activity    Alcohol use: Never     Frequency: Never    Drug use: Never    Sexual activity: Yes   Lifestyle    Physical activity     Days per week: None     Minutes per session: None    Stress: None   Relationships    Social connections     Talks on phone: None     Gets together: None     Attends Mosque service: None     Active member of club or organization: None     Attends meetings of clubs or organizations: None     Relationship status: None    Intimate partner violence     Fear of current or ex partner: None     Emotionally abused: None     Physically abused: None     Forced sexual activity: None   Other Topics Concern    None   Social History Narrative    None     Past Medical History:   Diagnosis Date    Allergic rhinitis     Asthma     Narcolepsy due to underlying condition without cataplexy     Tinnitus      Past Surgical History:   Procedure Laterality Date    APPENDECTOMY       SECTION      CHOLECYSTECTOMY      TONSILLECTOMY      TUBAL LIGATION           REVIEW OF SYSTEMS:  all other systems reviewed and are negative  General Health: no change in health status since last visit  Ears: ear pain No Resolved  Hearing: improved: Yes and  Left  Throat: pain: No and Resolved and painful swallowing: No and Resolved       Comments:     PHYSICAL EXAM:    Ht 5' 7\" (1.702 m)   Wt 235 lb (106.6 kg)   BMI 36.81 kg/m²   Body mass index is 36.81 kg/m². General Appearance: well developed , well nourished and no distress  Head/ Face: normocephalic and atraumatic  Vocal Quality: good/ normal  Ears: Right Ear: External: external ears normal Otoscopy Ear Canal: canal clear Otoscopy TM: TM's mobile, TM's intact and tympanosclerosis: mild Left Ear: External: external ears normal Otoscopy Ear Canal: canal clear Otoscopy TM: TM's mobile, TM's intact and tympanosclerosis: mild  Oral:lips: normal Oropharynx:normal tongue: normal   Tonsils: s/p tonsillectomy  Neck: negative and supple  Neuro: alert and oriented x3 and cranial nerves II- XII grossly intact  Psych/ Mood: cooperative and no depression, anxiety or agitation    Assessment & Plan:    Problem List Items Addressed This Visit        ENT Problems    Asymmetrical hearing loss, left     Still with slight asymmetry but overall left-sided hearing levels measurably improved. Patient is aware of hearing improvement  Patient counseled to return if she becomes aware of any change in hearing going forward         Lingual tonsillitis     Throat pain resolved. Good response to medical therapy            Other    Ear pain, right     Resolved. Presumed referred pain from now treated lingual tonsillitis               No orders of the defined types were placed in this encounter. No orders of the defined types were placed in this encounter. Please note that this chart was generated using dragon dictation software.   Although every effort was made to ensure the accuracy of this automated transcription, some errors in transcription may have

## 2021-02-25 NOTE — ASSESSMENT & PLAN NOTE
Still with slight asymmetry but overall left-sided hearing levels measurably improved.   Patient is aware of hearing improvement  Patient counseled to return if she becomes aware of any change in hearing going forward

## 2021-06-28 DIAGNOSIS — F33.1 MODERATE EPISODE OF RECURRENT MAJOR DEPRESSIVE DISORDER (HCC): ICD-10-CM

## 2021-06-28 DIAGNOSIS — F41.1 GAD (GENERALIZED ANXIETY DISORDER): ICD-10-CM

## 2021-06-28 NOTE — TELEPHONE ENCOUNTER
Received fax from pharmacy requesting refill on pts medication(s). Pt was last seen in office on 1/4/2021  and has a follow up scheduled for Visit date not found. Will send request to  [facility]  for patient.      Requested Prescriptions     Pending Prescriptions Disp Refills    venlafaxine (EFFEXOR XR) 150 MG extended release capsule [Pharmacy Med Name: venlafaxine  mg capsule,extended release 24 hr] 30 capsule 4     Sig: TAKE ONE CAPSULE BY MOUTH AT BEDTIME

## 2021-06-29 RX ORDER — VENLAFAXINE HYDROCHLORIDE 150 MG/1
150 CAPSULE, EXTENDED RELEASE ORAL NIGHTLY
Qty: 90 CAPSULE | Refills: 1 | Status: SHIPPED | OUTPATIENT
Start: 2021-06-29

## 2022-05-16 ENCOUNTER — OFFICE VISIT (OUTPATIENT)
Dept: ENT CLINIC | Age: 37
End: 2022-05-16
Payer: MEDICARE

## 2022-05-16 VITALS
TEMPERATURE: 97.4 F | HEIGHT: 67 IN | SYSTOLIC BLOOD PRESSURE: 128 MMHG | WEIGHT: 240 LBS | BODY MASS INDEX: 37.67 KG/M2 | DIASTOLIC BLOOD PRESSURE: 78 MMHG

## 2022-05-16 DIAGNOSIS — H92.03 OTALGIA OF BOTH EARS: Primary | ICD-10-CM

## 2022-05-16 PROCEDURE — 99214 OFFICE O/P EST MOD 30 MIN: CPT | Performed by: OTOLARYNGOLOGY

## 2022-05-16 RX ORDER — ALBUTEROL SULFATE 90 UG/1
AEROSOL, METERED RESPIRATORY (INHALATION)
COMMUNITY

## 2022-05-16 RX ORDER — DICLOFENAC SODIUM 75 MG/1
TABLET, DELAYED RELEASE ORAL
COMMUNITY

## 2022-05-16 RX ORDER — MELOXICAM 15 MG/1
15 TABLET ORAL DAILY
Qty: 30 TABLET | Refills: 2 | Status: SHIPPED | OUTPATIENT
Start: 2022-05-16 | End: 2022-05-17 | Stop reason: SDUPTHER

## 2022-05-16 ASSESSMENT — ENCOUNTER SYMPTOMS
GASTROINTESTINAL NEGATIVE: 1
ALLERGIC/IMMUNOLOGIC NEGATIVE: 1
EYES NEGATIVE: 1
RESPIRATORY NEGATIVE: 1

## 2022-05-16 NOTE — PROGRESS NOTES
2022    [facility] Ana (:  1985) is a 39 y.o. female, Established patient, here for evaluation of the following chief complaint(s):  New Patient (otalgia with drainage )      Vitals:    22 0809   BP: 128/78   Temp: 97.4 °F (36.3 °C)   Weight: 240 lb (108.9 kg)   Height: 5' 7\" (1.702 m)       Wt Readings from Last 3 Encounters:   22 240 lb (108.9 kg)   21 235 lb (106.6 kg)   21 234 lb (106.1 kg)       BP Readings from Last 3 Encounters:   22 128/78   21 (!) 144/86   12/15/20 138/78         SUBJECTIVE/OBJECTIVE:    New patient to me seen today for ear issues. She says that she was 11years old she has had ear pain and pressure. She said she had 1 set of tubes as a child they had to take them out. She says it does affect her hearing on both sides. Hearing test from last year shows type a tympanograms and asymmetric loss. She has not had a recent hearing test.  She was told by my predecessor that she might need ear tubes. Review of Systems   Constitutional: Negative. HENT: Positive for ear pain and hearing loss. Eyes: Negative. Respiratory: Negative. Cardiovascular: Negative. Gastrointestinal: Negative. Endocrine: Negative. Musculoskeletal: Negative. Skin: Negative. Allergic/Immunologic: Negative. Neurological: Negative. Hematological: Negative. Psychiatric/Behavioral: Negative. Physical Exam  Vitals reviewed. Constitutional:       Appearance: Normal appearance. She is normal weight. HENT:      Head: Normocephalic and atraumatic. Right Ear: Tympanic membrane, ear canal and external ear normal.      Left Ear: Tympanic membrane, ear canal and external ear normal.      Nose: Nose normal.      Mouth/Throat:      Mouth: Mucous membranes are moist.      Pharynx: Oropharynx is clear. Eyes:      Extraocular Movements: Extraocular movements intact. Pupils: Pupils are equal, round, and reactive to light. Cardiovascular:      Rate and Rhythm: Normal rate and regular rhythm. Pulmonary:      Effort: Pulmonary effort is normal.      Breath sounds: Normal breath sounds. Musculoskeletal:      Cervical back: Normal range of motion. Skin:     General: Skin is warm and dry. Neurological:      General: No focal deficit present. Mental Status: She is alert and oriented to person, place, and time. Psychiatric:         Mood and Affect: Mood normal.         Behavior: Behavior normal.              ASSESSMENT/PLAN:    1. Otalgia of both ears  Follow-up in 2 weeks with hearing test at that time. I will place her on anti-inflammatory to try to calm her ears down for now. No follow-ups on file. An electronic signature was used to authenticate this note. Shellie Leslie MD       Please note that this chart was generated using dragon dictation software. Although every effort was made to ensure the accuracy of this automated transcription, some errors in transcription may have occurred.

## 2022-05-17 ENCOUNTER — TELEPHONE (OUTPATIENT)
Dept: ENT CLINIC | Age: 37
End: 2022-05-17

## 2022-05-17 RX ORDER — MELOXICAM 15 MG/1
15 TABLET ORAL DAILY
Qty: 30 TABLET | Refills: 2 | Status: SHIPPED | OUTPATIENT
Start: 2022-05-17

## 2022-05-17 NOTE — TELEPHONE ENCOUNTER
Left message on patient's phone to let her know that the Mobic has been sent to [facility] in [place] as she requested.

## 2022-05-17 NOTE — TELEPHONE ENCOUNTER
Christoph called stating that the meloxicam (MOBIC) 15 MG tablet medication was sent to [facility]. She would like to script to be sent to [facility] in [place]. Please call her to advise if needed.     Last Appt:  5/16/2022  Next Appt:   6/2/2022  Preferred Pharmacy:  [facility] [address] [place] [address] [phone]

## 2022-06-02 ENCOUNTER — PROCEDURE VISIT (OUTPATIENT)
Dept: ENT CLINIC | Age: 37
End: 2022-06-02
Payer: MEDICARE

## 2022-06-02 ENCOUNTER — OFFICE VISIT (OUTPATIENT)
Dept: ENT CLINIC | Age: 37
End: 2022-06-02
Payer: MEDICARE

## 2022-06-02 VITALS
BODY MASS INDEX: 37.67 KG/M2 | SYSTOLIC BLOOD PRESSURE: 128 MMHG | HEIGHT: 67 IN | DIASTOLIC BLOOD PRESSURE: 80 MMHG | WEIGHT: 240 LBS

## 2022-06-02 DIAGNOSIS — H92.03 OTALGIA OF BOTH EARS: Primary | ICD-10-CM

## 2022-06-02 DIAGNOSIS — H90.3 SENSORINEURAL HEARING LOSS (SNHL) OF BOTH EARS: ICD-10-CM

## 2022-06-02 PROCEDURE — 99213 OFFICE O/P EST LOW 20 MIN: CPT | Performed by: OTOLARYNGOLOGY

## 2022-06-02 PROCEDURE — 92567 TYMPANOMETRY: CPT | Performed by: AUDIOLOGIST

## 2022-06-02 PROCEDURE — 92552 PURE TONE AUDIOMETRY AIR: CPT | Performed by: AUDIOLOGIST

## 2022-06-02 ASSESSMENT — ENCOUNTER SYMPTOMS
EYES NEGATIVE: 1
RESPIRATORY NEGATIVE: 1
GASTROINTESTINAL NEGATIVE: 1
ALLERGIC/IMMUNOLOGIC NEGATIVE: 1

## 2022-06-02 NOTE — PROGRESS NOTES
2022    Brittany Perez (:  1985) is a 39 y.o. female, Established patient, here for evaluation of the following chief complaint(s):  Follow-up (2 week otalgia f/u)      Vitals:    22 0923   BP: 128/80   Weight: 240 lb (108.9 kg)   Height: 5' 7\" (1.702 m)       Wt Readings from Last 3 Encounters:   22 240 lb (108.9 kg)   22 240 lb (108.9 kg)   21 235 lb (106.6 kg)       BP Readings from Last 3 Encounters:   22 128/80   22 128/78   21 (!) 144/86         SUBJECTIVE/OBJECTIVE:    Patient seen today for her ears. She still complains of bilateral ear pain. She feels like is behind her eardrums. Hearing test today is relatively unchanged from the previous 1. She still has type a tympanograms. She is convinced that this is an issue with her middle ear. Review of Systems   Constitutional: Negative. HENT: Positive for ear pain and hearing loss. Eyes: Negative. Respiratory: Negative. Cardiovascular: Negative. Gastrointestinal: Negative. Endocrine: Negative. Musculoskeletal: Negative. Skin: Negative. Allergic/Immunologic: Negative. Neurological: Negative. Hematological: Negative. Psychiatric/Behavioral: Negative. Physical Exam  Vitals reviewed. Constitutional:       Appearance: Normal appearance. She is normal weight. HENT:      Head: Normocephalic and atraumatic. Right Ear: Tympanic membrane, ear canal and external ear normal.      Left Ear: Tympanic membrane, ear canal and external ear normal.      Nose: Nose normal.      Mouth/Throat:      Mouth: Mucous membranes are moist.      Pharynx: Oropharynx is clear. Eyes:      Extraocular Movements: Extraocular movements intact. Pupils: Pupils are equal, round, and reactive to light. Cardiovascular:      Rate and Rhythm: Normal rate and regular rhythm. Pulmonary:      Effort: Pulmonary effort is normal.      Breath sounds: Normal breath sounds. Musculoskeletal:      Cervical back: Normal range of motion. Skin:     General: Skin is warm and dry. Neurological:      General: No focal deficit present. Mental Status: She is alert and oriented to person, place, and time. Psychiatric:         Mood and Affect: Mood normal.         Behavior: Behavior normal.              ASSESSMENT/PLAN:    1. Otalgia of both ears  2. Sensorineural hearing loss (SNHL) of both ears  We will plan on bilateral myringotomies. I want a start with this as I am not convinced this will help but is the most minimal procedure we can do to try to get a benefit. Return in 18 days (on 6/20/2022) for 1300 (1pm) for half hour appt, bilateral myringotomies. An electronic signature was used to authenticate this note. Terence Doyle MD       Please note that this chart was generated using dragon dictation software. Although every effort was made to ensure the accuracy of this automated transcription, some errors in transcription may have occurred.

## 2022-06-02 NOTE — PROGRESS NOTES
History   Sana Perez is a 39 y.o. female who presented to the clinic this date for a hearing evaluation following treatment for bilateral ear pain. Summary   Tympanometry consistent with abnormally stiff TM right and normal TM mobility left. Pure tone testing indicates normal hearing right and mild high frequency mixed hearing loss left. Thresholds today were similar to thresholds obtained on previous testing, no significant changes were noted. Results   Otoscopy:    Right: Clear EAC/Normal TM   Left: Clear EAC/Normal TM    Audiometry:    Right: Hearing WNL     Left: Mild high frequency mixed hearing loss (previously established)         Tympanometry:     Right: Type A, narrow gradient   Left: Type A    Plan   Results of today's testing were discussed with Ms Perez and the following recommendations were made:    1. Follow up with ENT as scheduled. 2. Monitor hearing yearly, sooner with changes. 3. Hearing protection as warranted. 4. May consider a hearing aid for the left ear if hearing loss causes communication difficulties.         Audiogram and Acoustic Immittance

## 2022-06-20 ENCOUNTER — PROCEDURE VISIT (OUTPATIENT)
Dept: ENT CLINIC | Age: 37
End: 2022-06-20
Payer: MEDICARE

## 2022-06-20 VITALS
BODY MASS INDEX: 37.98 KG/M2 | WEIGHT: 242 LBS | SYSTOLIC BLOOD PRESSURE: 124 MMHG | DIASTOLIC BLOOD PRESSURE: 86 MMHG | HEIGHT: 67 IN

## 2022-06-20 DIAGNOSIS — H90.3 SENSORINEURAL HEARING LOSS (SNHL) OF BOTH EARS: Primary | ICD-10-CM

## 2022-06-20 DIAGNOSIS — H92.03 OTALGIA OF BOTH EARS: ICD-10-CM

## 2022-06-20 PROCEDURE — 99214 OFFICE O/P EST MOD 30 MIN: CPT | Performed by: OTOLARYNGOLOGY

## 2022-06-20 PROCEDURE — 69433 CREATE EARDRUM OPENING: CPT | Performed by: OTOLARYNGOLOGY

## 2022-06-20 RX ORDER — OFLOXACIN 3 MG/ML
5 SOLUTION AURICULAR (OTIC) 2 TIMES DAILY
Qty: 10 ML | Refills: 0 | Status: SHIPPED | OUTPATIENT
Start: 2022-06-20 | End: 2022-06-25

## 2022-06-20 ASSESSMENT — ENCOUNTER SYMPTOMS
GASTROINTESTINAL NEGATIVE: 1
EYES NEGATIVE: 1
RESPIRATORY NEGATIVE: 1
ALLERGIC/IMMUNOLOGIC NEGATIVE: 1

## 2022-06-20 NOTE — PROGRESS NOTES
2022    Melvin Perez (:  1985) is a 39 y.o. female, Established patient, here for evaluation of the following chief complaint(s): Other (myringotomy )      Vitals:    22 1303   BP: 124/86   Weight: 242 lb (109.8 kg)   Height: 5' 7\" (1.702 m)       Wt Readings from Last 3 Encounters:   22 242 lb (109.8 kg)   22 240 lb (108.9 kg)   22 240 lb (108.9 kg)       BP Readings from Last 3 Encounters:   22 124/86   22 128/80   22 128/78         SUBJECTIVE/OBJECTIVE:    Patient seen today for her ears. She has sensorineural hearing loss bilaterally but constant ear pain in both ears. We talked about how eustachian tubes are likely not the issue but she wants to evaluate this further. Right side is worse than left. Review of Systems   Constitutional: Negative. HENT: Positive for ear pain and hearing loss. Eyes: Negative. Respiratory: Negative. Cardiovascular: Negative. Gastrointestinal: Negative. Endocrine: Negative. Musculoskeletal: Negative. Skin: Negative. Allergic/Immunologic: Negative. Neurological: Negative. Hematological: Negative. Psychiatric/Behavioral: Negative. Physical Exam  Vitals reviewed. Constitutional:       Appearance: Normal appearance. She is normal weight. HENT:      Head: Normocephalic and atraumatic. Right Ear: Tympanic membrane, ear canal and external ear normal.      Left Ear: Tympanic membrane, ear canal and external ear normal.      Nose: Nose normal.      Mouth/Throat:      Mouth: Mucous membranes are moist.      Pharynx: Oropharynx is clear. Eyes:      Extraocular Movements: Extraocular movements intact. Pupils: Pupils are equal, round, and reactive to light. Cardiovascular:      Rate and Rhythm: Normal rate and regular rhythm. Pulmonary:      Effort: Pulmonary effort is normal.      Breath sounds: Normal breath sounds.    Musculoskeletal:      Cervical back: Normal range of motion. Skin:     General: Skin is warm and dry. Neurological:      General: No focal deficit present. Mental Status: She is alert and oriented to person, place, and time. Psychiatric:         Mood and Affect: Mood normal.         Behavior: Behavior normal.        Bilateral myringotomies  After obtaining informed consent, the operative microscope was used to examine the right ear. The anterior-inferior quadrant was anesthetized with phenol. A small myringotomy was made and the middle ear was clear. The left ear was treated in similar fashion with similar results. ASSESSMENT/PLAN:    1. Sensorineural hearing loss (SNHL) of both ears  2. Otalgia of both ears  Myringotomies bilaterally. Drops for the next 5 days and follow-up in 2 weeks. Return in about 3 weeks (around 7/11/2022) for Ear check. An electronic signature was used to authenticate this note. Marianne Schmitz MD       Please note that this chart was generated using dragon dictation software. Although every effort was made to ensure the accuracy of this automated transcription, some errors in transcription may have occurred.